# Patient Record
Sex: MALE | HISPANIC OR LATINO | Employment: OTHER | URBAN - METROPOLITAN AREA
[De-identification: names, ages, dates, MRNs, and addresses within clinical notes are randomized per-mention and may not be internally consistent; named-entity substitution may affect disease eponyms.]

---

## 2022-05-10 ENCOUNTER — APPOINTMENT (OUTPATIENT)
Dept: GENERAL RADIOLOGY | Facility: HOSPITAL | Age: 52
End: 2022-05-10

## 2022-05-10 ENCOUNTER — HOSPITAL ENCOUNTER (OUTPATIENT)
Facility: HOSPITAL | Age: 52
Setting detail: OBSERVATION
Discharge: HOME | End: 2022-05-11
Attending: EMERGENCY MEDICINE | Admitting: EMERGENCY MEDICINE

## 2022-05-10 ENCOUNTER — APPOINTMENT (OUTPATIENT)
Dept: CT IMAGING | Facility: HOSPITAL | Age: 52
End: 2022-05-10

## 2022-05-10 DIAGNOSIS — H53.9 VISUAL CHANGES: Primary | ICD-10-CM

## 2022-05-10 DIAGNOSIS — I10 ELEVATED BLOOD PRESSURE READING WITH DIAGNOSIS OF HYPERTENSION: ICD-10-CM

## 2022-05-10 PROBLEM — G45.9 TIA (TRANSIENT ISCHEMIC ATTACK): Status: ACTIVE | Noted: 2022-05-10

## 2022-05-10 LAB
ALBUMIN SERPL-MCNC: 4.7 G/DL (ref 3.5–5.2)
ALBUMIN/GLOB SERPL: 1.6 G/DL
ALP SERPL-CCNC: 61 U/L (ref 39–117)
ALT SERPL W P-5'-P-CCNC: 32 U/L (ref 1–41)
ANION GAP SERPL CALCULATED.3IONS-SCNC: 14 MMOL/L (ref 5–15)
AST SERPL-CCNC: 25 U/L (ref 1–40)
BASOPHILS # BLD AUTO: 0.05 10*3/MM3 (ref 0–0.2)
BASOPHILS NFR BLD AUTO: 0.7 % (ref 0–1.5)
BILIRUB SERPL-MCNC: 0.3 MG/DL (ref 0–1.2)
BUN SERPL-MCNC: 19 MG/DL (ref 6–20)
BUN/CREAT SERPL: 22.1 (ref 7–25)
CALCIUM SPEC-SCNC: 10 MG/DL (ref 8.6–10.5)
CHLORIDE SERPL-SCNC: 99 MMOL/L (ref 98–107)
CO2 SERPL-SCNC: 25 MMOL/L (ref 22–29)
CREAT SERPL-MCNC: 0.86 MG/DL (ref 0.76–1.27)
CRP SERPL-MCNC: 0.52 MG/DL (ref 0–0.5)
DEPRECATED RDW RBC AUTO: 44.3 FL (ref 37–54)
EGFRCR SERPLBLD CKD-EPI 2021: 104.8 ML/MIN/1.73
EOSINOPHIL # BLD AUTO: 0.03 10*3/MM3 (ref 0–0.4)
EOSINOPHIL NFR BLD AUTO: 0.4 % (ref 0.3–6.2)
ERYTHROCYTE [DISTWIDTH] IN BLOOD BY AUTOMATED COUNT: 13.1 % (ref 12.3–15.4)
ERYTHROCYTE [SEDIMENTATION RATE] IN BLOOD: 5 MM/HR (ref 0–20)
GLOBULIN UR ELPH-MCNC: 3 GM/DL
GLUCOSE BLDC GLUCOMTR-MCNC: 114 MG/DL (ref 70–130)
GLUCOSE SERPL-MCNC: 108 MG/DL (ref 65–99)
HCT VFR BLD AUTO: 47.3 % (ref 37.5–51)
HGB BLD-MCNC: 16.2 G/DL (ref 13–17.7)
IMM GRANULOCYTES # BLD AUTO: 0.03 10*3/MM3 (ref 0–0.05)
IMM GRANULOCYTES NFR BLD AUTO: 0.4 % (ref 0–0.5)
LYMPHOCYTES # BLD AUTO: 1.36 10*3/MM3 (ref 0.7–3.1)
LYMPHOCYTES NFR BLD AUTO: 17.8 % (ref 19.6–45.3)
MCH RBC QN AUTO: 31.5 PG (ref 26.6–33)
MCHC RBC AUTO-ENTMCNC: 34.2 G/DL (ref 31.5–35.7)
MCV RBC AUTO: 91.8 FL (ref 79–97)
MONOCYTES # BLD AUTO: 0.47 10*3/MM3 (ref 0.1–0.9)
MONOCYTES NFR BLD AUTO: 6.1 % (ref 5–12)
NEUTROPHILS NFR BLD AUTO: 5.71 10*3/MM3 (ref 1.7–7)
NEUTROPHILS NFR BLD AUTO: 74.6 % (ref 42.7–76)
NRBC BLD AUTO-RTO: 0 /100 WBC (ref 0–0.2)
PLATELET # BLD AUTO: 243 10*3/MM3 (ref 140–450)
PMV BLD AUTO: 10.7 FL (ref 6–12)
POTASSIUM SERPL-SCNC: 3.9 MMOL/L (ref 3.5–5.2)
PROT SERPL-MCNC: 7.7 G/DL (ref 6–8.5)
QT INTERVAL: 354 MS
RBC # BLD AUTO: 5.15 10*6/MM3 (ref 4.14–5.8)
SARS-COV-2 RNA PNL SPEC NAA+PROBE: NOT DETECTED
SODIUM SERPL-SCNC: 138 MMOL/L (ref 136–145)
WBC NRBC COR # BLD: 7.65 10*3/MM3 (ref 3.4–10.8)

## 2022-05-10 PROCEDURE — 87635 SARS-COV-2 COVID-19 AMP PRB: CPT | Performed by: NURSE PRACTITIONER

## 2022-05-10 PROCEDURE — G0378 HOSPITAL OBSERVATION PER HR: HCPCS

## 2022-05-10 PROCEDURE — 70496 CT ANGIOGRAPHY HEAD: CPT

## 2022-05-10 PROCEDURE — 80053 COMPREHEN METABOLIC PANEL: CPT | Performed by: NURSE PRACTITIONER

## 2022-05-10 PROCEDURE — 82962 GLUCOSE BLOOD TEST: CPT

## 2022-05-10 PROCEDURE — C9803 HOPD COVID-19 SPEC COLLECT: HCPCS

## 2022-05-10 PROCEDURE — 85652 RBC SED RATE AUTOMATED: CPT | Performed by: EMERGENCY MEDICINE

## 2022-05-10 PROCEDURE — 99204 OFFICE O/P NEW MOD 45 MIN: CPT | Performed by: PSYCHIATRY & NEUROLOGY

## 2022-05-10 PROCEDURE — 80061 LIPID PANEL: CPT | Performed by: PHYSICIAN ASSISTANT

## 2022-05-10 PROCEDURE — 99284 EMERGENCY DEPT VISIT MOD MDM: CPT

## 2022-05-10 PROCEDURE — 85025 COMPLETE CBC W/AUTO DIFF WBC: CPT | Performed by: NURSE PRACTITIONER

## 2022-05-10 PROCEDURE — 93010 ELECTROCARDIOGRAM REPORT: CPT | Performed by: INTERNAL MEDICINE

## 2022-05-10 PROCEDURE — 86140 C-REACTIVE PROTEIN: CPT | Performed by: EMERGENCY MEDICINE

## 2022-05-10 PROCEDURE — 71045 X-RAY EXAM CHEST 1 VIEW: CPT

## 2022-05-10 PROCEDURE — 70498 CT ANGIOGRAPHY NECK: CPT

## 2022-05-10 PROCEDURE — 93005 ELECTROCARDIOGRAM TRACING: CPT | Performed by: NURSE PRACTITIONER

## 2022-05-10 PROCEDURE — 0 IOPAMIDOL PER 1 ML: Performed by: EMERGENCY MEDICINE

## 2022-05-10 PROCEDURE — 83036 HEMOGLOBIN GLYCOSYLATED A1C: CPT | Performed by: NURSE PRACTITIONER

## 2022-05-10 RX ORDER — PROPARACAINE HYDROCHLORIDE 5 MG/ML
1 SOLUTION/ DROPS OPHTHALMIC ONCE
Status: DISCONTINUED | OUTPATIENT
Start: 2022-05-10 | End: 2022-05-11 | Stop reason: HOSPADM

## 2022-05-10 RX ORDER — SODIUM CHLORIDE 0.9 % (FLUSH) 0.9 %
10 SYRINGE (ML) INJECTION AS NEEDED
Status: DISCONTINUED | OUTPATIENT
Start: 2022-05-10 | End: 2022-05-11 | Stop reason: HOSPADM

## 2022-05-10 RX ORDER — ATORVASTATIN CALCIUM 80 MG/1
80 TABLET, FILM COATED ORAL NIGHTLY
Status: DISCONTINUED | OUTPATIENT
Start: 2022-05-10 | End: 2022-05-11 | Stop reason: HOSPADM

## 2022-05-10 RX ORDER — LISINOPRIL 10 MG/1
10 TABLET ORAL ONCE
Status: COMPLETED | OUTPATIENT
Start: 2022-05-10 | End: 2022-05-10

## 2022-05-10 RX ORDER — ENALAPRIL MALEATE 20 MG/1
20 TABLET ORAL DAILY
COMMUNITY

## 2022-05-10 RX ORDER — HYDROCHLOROTHIAZIDE 25 MG/1
25 TABLET ORAL DAILY
COMMUNITY

## 2022-05-10 RX ORDER — NITROGLYCERIN 0.4 MG/1
0.4 TABLET SUBLINGUAL
Status: DISCONTINUED | OUTPATIENT
Start: 2022-05-10 | End: 2022-05-11 | Stop reason: HOSPADM

## 2022-05-10 RX ORDER — ONDANSETRON 4 MG/1
4 TABLET, FILM COATED ORAL EVERY 6 HOURS PRN
Status: DISCONTINUED | OUTPATIENT
Start: 2022-05-10 | End: 2022-05-11 | Stop reason: HOSPADM

## 2022-05-10 RX ORDER — ASPIRIN 325 MG
325 TABLET, DELAYED RELEASE (ENTERIC COATED) ORAL ONCE
Status: COMPLETED | OUTPATIENT
Start: 2022-05-10 | End: 2022-05-11

## 2022-05-10 RX ORDER — ONDANSETRON 2 MG/ML
4 INJECTION INTRAMUSCULAR; INTRAVENOUS EVERY 6 HOURS PRN
Status: DISCONTINUED | OUTPATIENT
Start: 2022-05-10 | End: 2022-05-11 | Stop reason: HOSPADM

## 2022-05-10 RX ORDER — SODIUM CHLORIDE 0.9 % (FLUSH) 0.9 %
10 SYRINGE (ML) INJECTION EVERY 12 HOURS SCHEDULED
Status: DISCONTINUED | OUTPATIENT
Start: 2022-05-10 | End: 2022-05-11 | Stop reason: HOSPADM

## 2022-05-10 RX ORDER — ACETAMINOPHEN 325 MG/1
650 TABLET ORAL EVERY 4 HOURS PRN
Status: DISCONTINUED | OUTPATIENT
Start: 2022-05-10 | End: 2022-05-11 | Stop reason: HOSPADM

## 2022-05-10 RX ADMIN — LISINOPRIL 10 MG: 10 TABLET ORAL at 19:38

## 2022-05-10 RX ADMIN — IOPAMIDOL 95 ML: 755 INJECTION, SOLUTION INTRAVENOUS at 21:37

## 2022-05-10 NOTE — ED PROVIDER NOTES
EMERGENCY DEPARTMENT ENCOUNTER    Room Number:  119/1  Date seen:  5/10/2022  Time seen: 18:57 EDT  PCP: Provider, No Known  Historian: marisela   ID # [id number]    HPI:  Chief complaint:black spots in vision, elevated BP  A complete HPI/ROS/PMH/PSH/SH/FH are unobtainable due to: n/a  Context:Pravin Penny is a 51 y.o. male with PMH hypertension for at least 10 years on HCTZ and Enalapril 5 mg  who presents to the ED with c/o 24 hours of black spots in his vision that started last night.  Described as spots that move when he moves his eyes and he denies loss of vision.  It is not made better/worse by anything. He has not had this problem before.  He has associated bilateral temporal headache.  Denies chest pain, shortness of breath or h/o diabetes.  States he is on Enalapril 5 mg from [place] and HCTZ (unknown dose).  He has no current PCP and no current  Eye care provider. He does wear eye glasses and denies any eye trauma or injury.     Patient was placed in face mask in first look. Patient was wearing facemask when I entered the room and throughout our encounter. I wore full protective equipment throughout this patient encounter including a N95 face mask, eye shield and gloves. Hand hygiene/washing of hands was performed before donning protective equipment and after removal when leaving the room.    MEDICAL RECORD REVIEW    ALLERGIES  Patient has no known allergies.    PAST MEDICAL HISTORY  Active Ambulatory Problems     Diagnosis Date Noted   • No Active Ambulatory Problems     Resolved Ambulatory Problems     Diagnosis Date Noted   • No Resolved Ambulatory Problems     No Additional Past Medical History       PAST SURGICAL HISTORY  No past surgical history on file.    FAMILY HISTORY  No family history on file.    SOCIAL HISTORY  Social History     Socioeconomic History   • Marital status:        REVIEW OF SYSTEMS  Review of Systems    All systems reviewed and negative except for those  discussed in HPI.     PHYSICAL EXAM    ED Triage Vitals [05/10/22 1651]   Temp Heart Rate Resp BP SpO2   97.3 °F (36.3 °C) 73 18 -- 97 %      Temp src Heart Rate Source Patient Position BP Location FiO2 (%)   Tympanic Monitor -- -- --     Physical Exam    I have reviewed the triage vital signs and nursing notes.      GENERAL: not distressed  HENT: nares patent, mm moist, no facial droop  EYES: no scleral icterus, PERRL, EOMI  NECK: no ROM limitations  CV: regular rhythm, regular rate, no murmur, no rubs, no gallups  RESPIRATORY: normal effort, CTAB  ABDOMEN: soft  : deferred  MUSCULOSKELETAL: no deformity  NEURO: alert, moves all extremities, follows commands    Cranial nerves 2-12 intact as tested.  Sensation intact.  5/5 strength in all extremities.  Normal cerebellar testing.  No drift in any extremity.  No dysarthria.  No aphasia.  No neglect/extinction.     SKIN: warm, dry    LAB RESULTS  Recent Results (from the past 24 hour(s))   ECG 12 Lead    Collection Time: 05/10/22  7:35 PM   Result Value Ref Range    QT Interval 354 ms   Comprehensive Metabolic Panel    Collection Time: 05/10/22  7:45 PM    Specimen: Blood   Result Value Ref Range    Glucose 108 (H) 65 - 99 mg/dL    BUN 19 6 - 20 mg/dL    Creatinine 0.86 0.76 - 1.27 mg/dL    Sodium 138 136 - 145 mmol/L    Potassium 3.9 3.5 - 5.2 mmol/L    Chloride 99 98 - 107 mmol/L    CO2 25.0 22.0 - 29.0 mmol/L    Calcium 10.0 8.6 - 10.5 mg/dL    Total Protein 7.7 6.0 - 8.5 g/dL    Albumin 4.70 3.50 - 5.20 g/dL    ALT (SGPT) 32 1 - 41 U/L    AST (SGOT) 25 1 - 40 U/L    Alkaline Phosphatase 61 39 - 117 U/L    Total Bilirubin 0.3 0.0 - 1.2 mg/dL    Globulin 3.0 gm/dL    A/G Ratio 1.6 g/dL    BUN/Creatinine Ratio 22.1 7.0 - 25.0    Anion Gap 14.0 5.0 - 15.0 mmol/L    eGFR 104.8 >60.0 mL/min/1.73   CBC Auto Differential    Collection Time: 05/10/22  7:45 PM    Specimen: Blood   Result Value Ref Range    WBC 7.65 3.40 - 10.80 10*3/mm3    RBC 5.15 4.14 - 5.80 10*6/mm3     Hemoglobin 16.2 13.0 - 17.7 g/dL    Hematocrit 47.3 37.5 - 51.0 %    MCV 91.8 79.0 - 97.0 fL    MCH 31.5 26.6 - 33.0 pg    MCHC 34.2 31.5 - 35.7 g/dL    RDW 13.1 12.3 - 15.4 %    RDW-SD 44.3 37.0 - 54.0 fl    MPV 10.7 6.0 - 12.0 fL    Platelets 243 140 - 450 10*3/mm3    Neutrophil % 74.6 42.7 - 76.0 %    Lymphocyte % 17.8 (L) 19.6 - 45.3 %    Monocyte % 6.1 5.0 - 12.0 %    Eosinophil % 0.4 0.3 - 6.2 %    Basophil % 0.7 0.0 - 1.5 %    Immature Grans % 0.4 0.0 - 0.5 %    Neutrophils, Absolute 5.71 1.70 - 7.00 10*3/mm3    Lymphocytes, Absolute 1.36 0.70 - 3.10 10*3/mm3    Monocytes, Absolute 0.47 0.10 - 0.90 10*3/mm3    Eosinophils, Absolute 0.03 0.00 - 0.40 10*3/mm3    Basophils, Absolute 0.05 0.00 - 0.20 10*3/mm3    Immature Grans, Absolute 0.03 0.00 - 0.05 10*3/mm3    nRBC 0.0 0.0 - 0.2 /100 WBC   Sedimentation Rate    Collection Time: 05/10/22  7:45 PM    Specimen: Blood   Result Value Ref Range    Sed Rate 5 0 - 20 mm/hr   C-reactive Protein    Collection Time: 05/10/22  7:45 PM    Specimen: Blood   Result Value Ref Range    C-Reactive Protein 0.52 (H) 0.00 - 0.50 mg/dL   POC Glucose Once    Collection Time: 05/10/22  7:47 PM    Specimen: Blood   Result Value Ref Range    Glucose 114 70 - 130 mg/dL   COVID-19,[facility] CEPHEID/YOLANDA NP SWAB IN TRANSPORT MEDIA 8-12 HR TAT - Swab, Nasopharynx    Collection Time: 05/10/22  8:21 PM    Specimen: Nasopharynx; Swab   Result Value Ref Range    COVID19 Not Detected Not Detected - Ref. Range         RADIOLOGY RESULTS  XR Chest 1 View    Result Date: 5/10/2022  PORTABLE CHEST X-RAY  HISTORY: Evaluate cardiomegaly emergently.  Portable chest x-ray is provided. Correlation: None.  FINDINGS: The cardiomediastinal silhouette is normal. The lungs are clear. The costophrenic sulci are dry and the bones appear normal. There is no pneumothorax.      Negative. There is no enlargement of the cardiac silhouette.  This report was finalized on 5/10/2022 8:02 PM by Dr. Urias  YG Chicas.           PROGRESS, DATA ANALYSIS, CONSULTS AND MEDICAL DECISION MAKING  All labs have been independently reviewed by me.  All radiology studies have been reviewed by me and discussed with radiologist dictating the report.  EKG's independently viewed and interpreted by me unless stated otherwise. Discussion below represents my analysis of pertinent findings related to patient's condition, differential diagnosis, treatment plan and final disposition.     ED Course as of 05/10/22 2253   Tue May 10, 2022   2017 Discussed patient with Dr. Mcdonald, Stroke neurologist.  Will get CTA head and neck and plan for admission.  [EW]   2045  ID # [id number] Used again.  Pt's home meds clarified.  He takes 20 mg Enalapril and 25 mg HCTZ.  I have updated him about plan for admission to OBS unit for stroke workup  [EW]   2114 Discussed admission with AVE Fong in OBS unit.   [EW]      ED Course User Index  [EW] Tracey Jiang APRN     DDX: hypertension, visual changes, TIA    MDM: Patient with no acute neurological deficits.  Care was discussed with stroke neurology and we felt the patient would benefit from overnight admission after CTA head and neck for MRI in the morning.     Reviewed pt's history and workup with Dr. Hyman.  After a bedside evaluation, Dr. Hyman agrees with the plan of care.    Based on the patient's lab findings and presenting symptoms, the doctor and I feel it is appropriate to admit the patient for further management, evaluation, and treatment.  I have discussed this with the admitting team.  I have also discussed this with the patient/family.  They are in agreement with admission.          Disposition vitals:  /98   Pulse 84   Temp 97.3 °F (36.3 °C) (Tympanic)   Resp 18   SpO2 97%       DIAGNOSIS  Final diagnoses:   Visual changes   Elevated blood pressure reading with diagnosis of hypertension       Admission to OBS unit     Tracey Jiang APRN  05/10/22  [id number]

## 2022-05-10 NOTE — ED NOTES
PT presents to ED with complaints of L eye pain x3 days with intermitting headache. Pt speaks [place]. With interputer pt is a&OX4, able to ambulate, and in a mask at this time.

## 2022-05-10 NOTE — ED PROVIDER NOTES
The VICKY and I have discussed this patient's history, physical exam, and treatment plan.  I have reviewed the documentation and personally had a face to face interaction with the patient. I affirm the documentation and agree with the treatment and plan.  The attached note describes my personal findings.      I provided a substantive portion of the care of the patient.  I personally performed the physical exam in its entirety, and below are my findings.     Brief history of present illness: 51-year-old male with history of hypertension and takes medicines he brought with him from [place] presents to the emergency department with complaining of spots in the vision of his left eye primarily that seem to wax and wane and has not completely lost vision.  No clear exacerbating relieving factors.  He has had some mild by lateral temporal headache.  He denies any other pain at this time.    Physical exam:    BP (!) 182/117   Pulse 73   Temp 97.3 °F (36.3 °C) (Tympanic)   Resp 18   SpO2 97%       Physical Exam   Constitutional: No distress.  Nontoxic  HENT:  Head: Normocephalic and atraumatic.   Oropharynx: Mucous membranes are moist.   Eyes: . No scleral icterus. No conjunctival pallor.  PERRL, EOMI  Neck: Normal range of motion. Neck supple.  No meningismus.  No bruit appreciated  Cardiovascular: Pink warm and well perfused throughout.    Pulmonary/Chest: No respiratory distress.  No tachypnea or increased work of breathing appreciated.    Abdominal: Soft. There is no tenderness. There is no rebound and no guarding.   Musculoskeletal: Moves all extremities equally.    Neurological: Alert and oriented.  No acute focal deficit appreciated.  Skin: Skin is pink, warm, and dry.   Psychiatric: Mood and affect normal.   Nursing note and vitals reviewed.        MDM:      I have personally reviewed and interpreted the EKG obtained today in the emergency department at 1935 concomitant with treatment.  EKG reveals rhythm/rate -sinus,  85. ND-LINO within normal limits.  QRS-narrow complex ST/T-wave -no STEMI; QTC within normal limits; comparison none available    Given the patient's uncontrolled hypertension and new onset unilateral vision issues, amaurosis fugax or carotid disease must be considered.  Also considered is giant cell arteritis or atypical migraines.  Plan further imaging and neurology consultation.     Mark Hyman MD  05/10/22 2015

## 2022-05-11 ENCOUNTER — APPOINTMENT (OUTPATIENT)
Dept: MRI IMAGING | Facility: HOSPITAL | Age: 52
End: 2022-05-11

## 2022-05-11 ENCOUNTER — APPOINTMENT (OUTPATIENT)
Dept: CARDIOLOGY | Facility: HOSPITAL | Age: 52
End: 2022-05-11

## 2022-05-11 ENCOUNTER — READMISSION MANAGEMENT (OUTPATIENT)
Dept: CALL CENTER | Facility: HOSPITAL | Age: 52
End: 2022-05-11

## 2022-05-11 VITALS
DIASTOLIC BLOOD PRESSURE: 88 MMHG | OXYGEN SATURATION: 97 % | WEIGHT: 190 LBS | HEIGHT: 65 IN | SYSTOLIC BLOOD PRESSURE: 140 MMHG | BODY MASS INDEX: 31.65 KG/M2 | HEART RATE: 76 BPM | RESPIRATION RATE: 16 BRPM | TEMPERATURE: 98.4 F

## 2022-05-11 LAB
ANION GAP SERPL CALCULATED.3IONS-SCNC: 11.8 MMOL/L (ref 5–15)
BH CV ECHO MEAS - ACS: 2.34 CM
BH CV ECHO MEAS - AO MAX PG: 7.1 MMHG
BH CV ECHO MEAS - AO MEAN PG: 4.6 MMHG
BH CV ECHO MEAS - AO ROOT DIAM: 3.4 CM
BH CV ECHO MEAS - AO V2 MAX: 133 CM/SEC
BH CV ECHO MEAS - AO V2 VTI: 21.2 CM
BH CV ECHO MEAS - AVA(I,D): 3 CM2
BH CV ECHO MEAS - EDV(CUBED): 25.6 ML
BH CV ECHO MEAS - EDV(MOD-SP2): 40 ML
BH CV ECHO MEAS - EDV(MOD-SP4): 41 ML
BH CV ECHO MEAS - EF(MOD-BP): 64.8 %
BH CV ECHO MEAS - EF(MOD-SP2): 65 %
BH CV ECHO MEAS - EF(MOD-SP4): 61 %
BH CV ECHO MEAS - ESV(CUBED): 8.5 ML
BH CV ECHO MEAS - ESV(MOD-SP2): 14 ML
BH CV ECHO MEAS - ESV(MOD-SP4): 16 ML
BH CV ECHO MEAS - FS: 30.7 %
BH CV ECHO MEAS - IVS/LVPW: 0.71 CM
BH CV ECHO MEAS - IVSD: 1.37 CM
BH CV ECHO MEAS - LA DIMENSION: 3.4 CM
BH CV ECHO MEAS - LAT PEAK E' VEL: 15.4 CM/SEC
BH CV ECHO MEAS - LV DIASTOLIC VOL/BSA (35-75): 20.5 CM2
BH CV ECHO MEAS - LV MASS(C)D: 182.4 GRAMS
BH CV ECHO MEAS - LV MAX PG: 4.2 MMHG
BH CV ECHO MEAS - LV MEAN PG: 2.19 MMHG
BH CV ECHO MEAS - LV SYSTOLIC VOL/BSA (12-30): 8 CM2
BH CV ECHO MEAS - LV V1 MAX: 102.8 CM/SEC
BH CV ECHO MEAS - LV V1 VTI: 16.7 CM
BH CV ECHO MEAS - LVIDD: 2.9 CM
BH CV ECHO MEAS - LVIDS: 2.04 CM
BH CV ECHO MEAS - LVOT AREA: 3.8 CM2
BH CV ECHO MEAS - LVOT DIAM: 2.2 CM
BH CV ECHO MEAS - LVPWD: 1.6 CM
BH CV ECHO MEAS - MED PEAK E' VEL: 9.1 CM/SEC
BH CV ECHO MEAS - MV A MAX VEL: 66.4 CM/SEC
BH CV ECHO MEAS - MV DEC SLOPE: 260 CM/SEC2
BH CV ECHO MEAS - MV DEC TIME: 0.22 MSEC
BH CV ECHO MEAS - MV E MAX VEL: 57 CM/SEC
BH CV ECHO MEAS - MV E/A: 0.86
BH CV ECHO MEAS - MV MAX PG: 3.7 MMHG
BH CV ECHO MEAS - MV MEAN PG: 1.28 MMHG
BH CV ECHO MEAS - MV P1/2T: 76.5 MSEC
BH CV ECHO MEAS - MV V2 VTI: 19.5 CM
BH CV ECHO MEAS - MVA(P1/2T): 2.9 CM2
BH CV ECHO MEAS - MVA(VTI): 3.2 CM2
BH CV ECHO MEAS - PA ACC TIME: 0.15 SEC
BH CV ECHO MEAS - PA PR(ACCEL): 12.5 MMHG
BH CV ECHO MEAS - PA V2 MAX: 123.5 CM/SEC
BH CV ECHO MEAS - QP/QS: 1.31
BH CV ECHO MEAS - RV MAX PG: 2.7 MMHG
BH CV ECHO MEAS - RV V1 MAX: 81.8 CM/SEC
BH CV ECHO MEAS - RV V1 VTI: 15.2 CM
BH CV ECHO MEAS - RVOT DIAM: 2.6 CM
BH CV ECHO MEAS - SI(MOD-SP2): 13 ML/M2
BH CV ECHO MEAS - SI(MOD-SP4): 12.5 ML/M2
BH CV ECHO MEAS - SV(LVOT): 63.3 ML
BH CV ECHO MEAS - SV(MOD-SP2): 26 ML
BH CV ECHO MEAS - SV(MOD-SP4): 25 ML
BH CV ECHO MEAS - SV(RVOT): 82.8 ML
BH CV ECHO MEAS - TAPSE (>1.6): 1.8 CM
BH CV ECHO MEASUREMENTS AVERAGE E/E' RATIO: 4.65
BH CV XLRA - RV BASE: 2.8 CM
BH CV XLRA - RV LENGTH: 6.2 CM
BH CV XLRA - RV MID: 2.6 CM
BH CV XLRA - TDI S': 17.7 CM/SEC
BUN SERPL-MCNC: 16 MG/DL (ref 6–20)
BUN/CREAT SERPL: 18.2 (ref 7–25)
CALCIUM SPEC-SCNC: 9.5 MG/DL (ref 8.6–10.5)
CHLORIDE SERPL-SCNC: 100 MMOL/L (ref 98–107)
CHOLEST SERPL-MCNC: 211 MG/DL (ref 0–200)
CO2 SERPL-SCNC: 27.2 MMOL/L (ref 22–29)
CREAT SERPL-MCNC: 0.88 MG/DL (ref 0.76–1.27)
DEPRECATED RDW RBC AUTO: 43.8 FL (ref 37–54)
EGFRCR SERPLBLD CKD-EPI 2021: 104.1 ML/MIN/1.73
ERYTHROCYTE [DISTWIDTH] IN BLOOD BY AUTOMATED COUNT: 13.1 % (ref 12.3–15.4)
GLUCOSE SERPL-MCNC: 95 MG/DL (ref 65–99)
HBA1C MFR BLD: 5.6 % (ref 4.8–5.6)
HCT VFR BLD AUTO: 46.2 % (ref 37.5–51)
HDLC SERPL-MCNC: 36 MG/DL (ref 40–60)
HGB BLD-MCNC: 15.5 G/DL (ref 13–17.7)
LDLC SERPL CALC-MCNC: 143 MG/DL (ref 0–100)
LDLC/HDLC SERPL: 3.89 {RATIO}
LEFT ATRIUM VOLUME INDEX: 11.6 ML/M2
MAXIMAL PREDICTED HEART RATE: 169 BPM
MCH RBC QN AUTO: 31 PG (ref 26.6–33)
MCHC RBC AUTO-ENTMCNC: 33.5 G/DL (ref 31.5–35.7)
MCV RBC AUTO: 92.4 FL (ref 79–97)
PLATELET # BLD AUTO: 232 10*3/MM3 (ref 140–450)
PMV BLD AUTO: 10.9 FL (ref 6–12)
POTASSIUM SERPL-SCNC: 3.4 MMOL/L (ref 3.5–5.2)
RBC # BLD AUTO: 5 10*6/MM3 (ref 4.14–5.8)
SINUS: 2.8 CM
SODIUM SERPL-SCNC: 139 MMOL/L (ref 136–145)
STJ: 2.8 CM
STRESS TARGET HR: 144 BPM
TRIGL SERPL-MCNC: 175 MG/DL (ref 0–150)
VLDLC SERPL-MCNC: 32 MG/DL (ref 5–40)
WBC NRBC COR # BLD: 8.93 10*3/MM3 (ref 3.4–10.8)

## 2022-05-11 PROCEDURE — 99213 OFFICE O/P EST LOW 20 MIN: CPT | Performed by: NURSE PRACTITIONER

## 2022-05-11 PROCEDURE — 93306 TTE W/DOPPLER COMPLETE: CPT | Performed by: INTERNAL MEDICINE

## 2022-05-11 PROCEDURE — G0378 HOSPITAL OBSERVATION PER HR: HCPCS

## 2022-05-11 PROCEDURE — 85027 COMPLETE CBC AUTOMATED: CPT | Performed by: PHYSICIAN ASSISTANT

## 2022-05-11 PROCEDURE — 93306 TTE W/DOPPLER COMPLETE: CPT

## 2022-05-11 PROCEDURE — 70551 MRI BRAIN STEM W/O DYE: CPT

## 2022-05-11 PROCEDURE — 80048 BASIC METABOLIC PNL TOTAL CA: CPT | Performed by: PHYSICIAN ASSISTANT

## 2022-05-11 RX ORDER — HYDROCHLOROTHIAZIDE 25 MG/1
25 TABLET ORAL DAILY
Status: DISCONTINUED | OUTPATIENT
Start: 2022-05-11 | End: 2022-05-11 | Stop reason: HOSPADM

## 2022-05-11 RX ORDER — ENALAPRIL MALEATE 20 MG/1
20 TABLET ORAL DAILY
Status: DISCONTINUED | OUTPATIENT
Start: 2022-05-11 | End: 2022-05-11 | Stop reason: HOSPADM

## 2022-05-11 RX ADMIN — Medication 10 ML: at 00:42

## 2022-05-11 RX ADMIN — ASPIRIN 325 MG: 325 TABLET, COATED ORAL at 00:42

## 2022-05-11 RX ADMIN — HYDROCHLOROTHIAZIDE 25 MG: 25 TABLET ORAL at 08:46

## 2022-05-11 RX ADMIN — Medication 10 ML: at 08:47

## 2022-05-11 RX ADMIN — ENALAPRIL MALEATE 20 MG: 20 TABLET ORAL at 08:46

## 2022-05-11 RX ADMIN — ATORVASTATIN CALCIUM 80 MG: 80 TABLET, FILM COATED ORAL at 00:42

## 2022-05-11 NOTE — PLAN OF CARE
Goal Outcome Evaluation:  Plan of Care Reviewed With: patient        Progress: improving  Outcome Evaluation: Pt admitted from ER to Obs due to potential TIA; neuro consulted; ophathalmalogy consulted; case management consulted; CT completed; pt primary language Vietnamese,  used; MRI planned; NIH 0; pt alert, oriented, and resting in bed; pt denies pain; vss

## 2022-05-11 NOTE — PROGRESS NOTES
MD ATTESTATION NOTE    The VICKY and I have discussed this patient's history, physical exam, and treatment plan.  I have reviewed the documentation and personally had a face to face interaction with the patient. I affirm the documentation and agree with the treatment and plan.  The attached note describes my personal findings.      I provided a substantive portion of the care of the patient.  I personally performed the physical exam in its entirety, and below are my findings.  For this patient encounter, the patient wore surgical mask, I wore full protective PPE including N95 and eye protection.      Brief HPI: Patient presents complaining of 24 hours of black spots in his vision states that he moves when he moves his eyes denies any loss of visual acuity.  Patient never had this problem before.  Also for temporal headache.    PHYSICAL EXAM  ED Triage Vitals   Temp Heart Rate Resp BP SpO2   05/10/22 1651 05/10/22 1651 05/10/22 1651 05/10/22 1918 05/10/22 1651   97.3 °F (36.3 °C) 73 18 (!) 182/117 97 %      Temp src Heart Rate Source Patient Position BP Location FiO2 (%)   05/10/22 1651 05/10/22 1651 -- -- --   Tympanic Monitor            GENERAL: no acute distress  HENT: nares patent  EYES: no scleral icterus, EOMI PERRLA  CV: regular rhythm, normal rate  RESPIRATORY: normal effort  ABDOMEN: soft  MUSCULOSKELETAL: no deformity  NEURO: alert, moves all extremities, follows commands  PSYCH:  calm, cooperative  SKIN: warm, dry    Vital signs and nursing notes reviewed.        Plan: Neuro consult.  CT CTA, MRI pending

## 2022-05-11 NOTE — CASE MANAGEMENT/SOCIAL WORK
Discharge Planning Assessment  [facility]     Patient Name: Pravin Penny  MRN: [id number]  Today's Date: 5/11/2022    Admit Date: 5/10/2022     Discharge Needs Assessment     Row Name 05/11/22 1314       Living Environment    People in Home spouse    Current Living Arrangements apartment    Primary Care Provided by self    Provides Primary Care For no one    Family Caregiver if Needed spouse    Able to Return to Prior Arrangements yes       Resource/Environmental Concerns    Resource/Environmental Concerns financial    Financial Concerns other (see comments)  Patient does have insurance. He has Passport Medicaid.    Transportation Concerns none       Transition Planning    Patient/Family Anticipates Transition to home with family    Patient/Family Anticipated Services at Transition none    Transportation Anticipated family or friend will provide       Discharge Needs Assessment    Readmission Within the Last 30 Days no previous admission in last 30 days    Equipment Currently Used at Home none    Concerns to be Addressed discharge planning  Patient provided with [place] language list of primary care providers that accept Passport. Highligted provider closest to his home.    Anticipated Changes Related to Illness none    Equipment Needed After Discharge none               Discharge Plan     Row Name 05/11/22 3536       Plan    Plan Patient plans to return home upon discharge, where he lives with spouse in private residence. IND with IADL's and owns/uses no DME. He recently immigrated from [place] and reports increased stress from the move. He does not have a PCP here, so a list of [place] speaking providers that accept Passport was provided to him. I highligted the provider that is closest to his home and informed them that they take walk-ins during their normal hours of operation. Patient has reliable transportation home from his wife upon discharge. He reports that he has a month supply of his  prescriptions from [place] and is not in urgent need of any refills. He understands to follow up with one of the clinics on the list provided to him for his future refills and knows to bring a medication list or his medications with him to the clinic. No further discharge needs anticipated. Encouraged to contact CCP should needs change.    Patient/Family in Agreement with Plan yes    Plan Comments Home with spouse upon discharge, who can provide transportation. Patient provided with PCP follow-up information. No DC needs anticipated.              Continued Care and Services - Admitted Since 5/10/2022    Coordination has not been started for this encounter.          Demographic Summary     Row Name 05/11/22 1314       General Information    Preferred Language Maltese    Row Name 05/11/22 1312       General Information    Admission Type observation    Arrived From home    Required Notices Provided Observation Status Notice    Expected Length of Stay (LOS) Less than 24 hours. Admitted to ED Observation Unit.    Referral Source admission list;emergency department    Reason for Consult discharge planning    Preferred Language Maltese    General Information Comments Facesheet verified. Role of CCP explained. Appropriate PPE worn at all times.  used.       Contact Information    Permission Granted to Share Info With family/designee               Functional Status     Row Name 05/11/22 1313       Functional Status    Usual Activity Tolerance good    Current Activity Tolerance good       Functional Status, IADL    Medications independent    Meal Preparation independent    Housekeeping independent    Laundry independent    Shopping independent    IADL Comments IND with IADL's.       Mental Status    General Appearance WDL WDL       Mental Status Summary    Recent Changes in Mental Status/Cognitive Functioning no changes               Psychosocial     Row Name 05/11/22 1316       Behavior WDL    Behavior WDL WDL        Emotion Mood WDL    Emotion/Mood/Affect WDL WDL       Speech WDL    Speech WDL WDL       Perceptual State WDL    Perceptual State WDL WDL       Thought Process WDL    Thought Process WDL WDL       Intellectual Performance WDL    Intellectual Performance WDL WDL       Coping/Stress    Major Change/Loss/Stressor recent immigration    Patient Personal Strengths able to adapt;expressive of emotions;expressive of needs;self-reliant;motivated    Reaction to Health Status accepting;adjusting    Understanding of Condition and Treatment adequate understanding of medical condition;adequate understanding of treatment       Developmental Stage (Eriksson's)    Developmental Stage Stage 7 (35-65 years/Middle Adulthood) Generativity vs. Stagnation               Abuse/Neglect    No documentation.                Legal    No documentation.                Substance Abuse    No documentation.                Patient Forms    No documentation.                   Jamey Leon RN

## 2022-05-11 NOTE — ED NOTES
.  Nursing report ED to floor  Pravni Penny  51 y.o.  male    HPI :   Chief Complaint   Patient presents with   • Eye Problem       Admitting doctor:   Hay LLANES MD    Admitting diagnosis:   There were no encounter diagnoses.    Code status:   Current Code Status     Date Active Code Status Order ID Comments User Context       Not on file    Advance Care Planning Activity          Allergies:   Patient has no known allergies.    Intake and Output  No intake or output data in the 24 hours ending 05/10/22 2137    Weight:   There were no vitals filed for this visit.    Most recent vitals:   Vitals:    05/10/22 1918 05/10/22 1938 05/10/22 1939 05/10/22 2021   BP: (!) 182/117 159/100  (!) 164/101   Pulse:   85 84   Resp:       Temp:       TempSrc:       SpO2:           Active LDAs/IV Access:   Lines, Drains & Airways     Active LDAs     Name Placement date Placement time Site Days    Peripheral IV 05/10/22 1946 Right Antecubital 05/10/22  1946  Antecubital  less than 1                Labs (abnormal labs have a star):   Labs Reviewed   COMPREHENSIVE METABOLIC PANEL - Abnormal; Notable for the following components:       Result Value    Glucose 108 (*)     All other components within normal limits    Narrative:     GFR Normal >60  Chronic Kidney Disease <60  Kidney Failure <15     CBC WITH AUTO DIFFERENTIAL - Abnormal; Notable for the following components:    Lymphocyte % 17.8 (*)     All other components within normal limits   COVID-19,[facility] CEPHEID/YOLANDA, NP SWAB IN TRANSPORT MEDIA 8-12 HR TAT - Normal    Narrative:     Fact sheet for providers: https://www.fda.gov/media/405164/download    Fact sheet for patients: https://www.fda.gov/media/474830/download    Test performed by PCR.   SEDIMENTATION RATE - Normal   POCT GLUCOSE FINGERSTICK - Normal   COVID PRE-OP / PRE-PROCEDURE SCREENING ORDER (NO ISOLATION)    Narrative:     The following orders were created for panel order COVID PRE-OP /  PRE-PROCEDURE SCREENING ORDER (NO ISOLATION) - Swab, Nasopharynx.  Procedure                               Abnormality         Status                     ---------                               -----------         ------                     COVID-19,[facility]-HOUSE...[[id number]]  Normal              Final result                 Please view results for these tests on the individual orders.   HEMOGLOBIN A1C   C-REACTIVE PROTEIN   POCT GLUCOSE FINGERSTICK   CBC AND DIFFERENTIAL    Narrative:     The following orders were created for panel order CBC & Differential.  Procedure                               Abnormality         Status                     ---------                               -----------         ------                     CBC Auto Differential[[id number]]        Abnormal            Final result                 Please view results for these tests on the individual orders.       EKG:   ECG 12 Lead   Preliminary Result   HEART RATE= 85  bpm   RR Interval= 704  ms   WY Interval= 192  ms   P Horizontal Axis= -12  deg   P Front Axis= 30  deg   QRSD Interval= 82  ms   QT Interval= 354  ms   QRS Axis= 14  deg   T Wave Axis= 34  deg   - BORDERLINE ECG -   Sinus rhythm   Probable left atrial enlargement   Electronically Signed By:    Date and Time of Study: 2022-05-10 19:35:27          Meds given in ED:   Medications   fluorescein ophthalmic strip 1 strip (has no administration in time range)   proparacaine (ALCAINE) 0.5 % ophthalmic solution 1 drop (has no administration in time range)   iopamidol (ISOVUE-370) 76 % injection 100 mL (has no administration in time range)   lisinopril (PRINIVIL,ZESTRIL) tablet 10 mg (10 mg Oral Given 5/10/22 1938)       Imaging results:  XR Chest 1 View    Result Date: 5/10/2022  Negative. There is no enlargement of the cardiac silhouette.  This report was finalized on 5/10/2022 8:02 PM by Dr. Adonay Chicas M.D.        Ambulatory status:   - ad david    Social issues:   Social History      Socioeconomic History   • Marital status:

## 2022-05-11 NOTE — PROGRESS NOTES
ED OBSERVATION PROGRESS/DISCHARGE SUMMARY    Date of Admission: 5/10/2022   LOS: 0 days   PCP: Provider, No Known      Subjective     Patient states that he is still having black floaters in both eyes but is able to see at this time.  Denies any headaches.  Denies any numbness and tingling or weakness.  Denies any chest pain or shortness of breath.  Denies any fevers or chills.  Denies any abdominal pain, nausea, vomiting, and diarrhea.  Patient states that he has over 30 days of his home medications at this time.  He also states that he has someone to drive him home as well as drive him to his optometrist appointment.      Hospital Outcome:   51-year-old Latvian-speaking male with a history of hypertension who presents to [facility] ER with black spots bilaterally in his vision since last night.  CT head and CTA of the head and neck were negative for acute findings in the ER.  Patient was admitted to the observation unit for further evaluation.  MRI of the brain without contrast showed no acute findings.  A1c was 5.6 and lipid panel revealed triglycerides of 175, , HDL 36, total cholesterol of 211.  An echocardiogram was performed that showed an EF of 64.8 with normal systolic and diastolic function and moderate LVH.  Neurology saw and evaluated the patient recommending close follow-up with ophthalmology however this was unable to be scheduled due to the office that we had contacted earlier with Dr. Mcdonald stating that they would not be able to take his insurance.  Discussed this with Dr. Mcdonald who recommended for him to follow-up with an optometrist tomorrow morning.  Set up an appointment with Dr. Wei Mitchell at [facility] tomorrow at 7:30 AM.  Patient is also been provided with a list of Latvian-speaking primary care providers and advised to follow-up with them within 1 to 2 weeks.  I have discussed all of the results and plan for discharge with the patient who  endorses understanding is in agreement.  Patient states that he is able to make the appointment with Dr. Wei Mitchell tomorrow at 7:30 AM and will have someone drive him.   was used during this encounter.    ROS:  General: no fevers, chills  Respiratory: no cough, dyspnea  Cardiovascular: no chest pain, palpitations  Abdomen: No abdominal pain, nausea, vomiting, or diarrhea  Neurologic: No focal weakness    Objective   Physical Exam:  I have reviewed the vital signs.  Temp:  [97.3 °F (36.3 °C)-98.6 °F (37 °C)] 98.4 °F (36.9 °C)  Heart Rate:  [68-88] 76  Resp:  [16-18] 16  BP: (137-182)/() 140/88  General Appearance:  51-year-old male, well-nourished, no acute distress on room air  Head:    Normocephalic, atraumatic  Eyes:    Sclerae anicteric  Neck:   Supple, no mass  Lungs: Clear to auscultation bilaterally, respirations unlabored  Heart: Regular rate and rhythm, S1 and S2 normal, no murmur, rub or gallop  Abdomen:  Soft, non-tender, bowel sounds active, nondistended  Extremities: No clubbing, cyanosis, or edema to lower extremities  Pulses:  2+ and symmetric in distal lower extremities  Skin: No rashes   Neurologic: Oriented x3, Normal strength to extremities, sensation intact, no facial droop or dysarthria noted    Results Review:    I have reviewed the labs, radiology results and diagnostic studies.    Results from last 7 days   Lab Units 05/11/22  0548   WBC 10*3/mm3 8.93   HEMOGLOBIN g/dL 15.5   HEMATOCRIT % 46.2   PLATELETS 10*3/mm3 232     Results from last 7 days   Lab Units 05/11/22  0548 05/10/22  1945   SODIUM mmol/L 139 138   POTASSIUM mmol/L 3.4* 3.9   CHLORIDE mmol/L 100 99   CO2 mmol/L 27.2 25.0   BUN mg/dL 16 19   CREATININE mg/dL 0.88 0.86   CALCIUM mg/dL 9.5 10.0   BILIRUBIN mg/dL  --  0.3   ALK PHOS U/L  --  61   ALT (SGPT) U/L  --  32   AST (SGOT) U/L  --  25   GLUCOSE mg/dL 95 108*     Imaging Results (Last 24 Hours)     Procedure Component Value Units Date/Time    MRI Brain  Without Contrast [[id number]] Collected: 05/11/22 1247     Updated: 05/11/22 1258    Narrative:      MRI EXAMINATION OF THE BRAIN WITHOUT CONTRAST     HISTORY: TIA.     COMPARISON: CT angiogram 05/10/2022.     TECHNIQUE: A MRI examination of the brain was performed before and after  the intravenous administration of contrast utilizing sagittal T1, axial  diffusion, T1, T2, T2 FLAIR and gradient echo T2 imaging.     FINDINGS: There is no evidence of restricted diffusion to suggest acute  infarction. The brain ventricles are symmetrical. There is expected  flow-void in the basilar artery and in the distal aspect of the internal  carotid arteries bilaterally on the axial T2 sequence. A small volume of  fluid is present within the mastoid air cells on the right. There is no  evidence of mass or mass effect on this noncontrasted MRI examination of  brain.       Impression:      No evidence of acute infarction.     This report was finalized on 5/11/2022 12:54 PM by Dr. Jan Olmstead M.D.       CT Angiogram Head [[id number]] Collected: 05/10/22 2247     Updated: 05/10/22 2257    Narrative:      CT ANGIOGRAM HEAD-, CT ANGIOGRAM NECK-     CLINICAL HISTORY: Vision abnormalities.     TECHNIQUE: Transverse 3 mm thick images were obtained from the base of  the skull to the vertex without IV contrast. Subsequently, spiral CT  images were obtained through the neck and head during rapid IV injection  of contrast and were reconstructed in 1 mm thick axial slices. Multiple  coronal and sagittal and 3-D reconstructions were produced.     Radiation dose reduction techniques were utilized, including automated  exposure control and exposure modulation based on body size.     COMPARISON: None     FINDINGS: The precontrast images demonstrate no abnormalities. There is  no evidence of recent or old infarct or hemorrhage. There are no masses.  In particular, no mass is evident within or adjacent to the sella  turcica. The orbits appear  grossly normal.     There is normal branching of the great vessels from the aortic arch that  all appear widely patent without NASCET significant stenosis. No  stenoses are present within either common or external or internal  carotid artery within the neck. The vertebral arteries are codominant  and are both widely patent throughout their course in the neck and head.  Both supply the basilar artery. There are no intracranial stenoses or  occlusions. No aneurysms are evident.     Postcontrast images of the brain parenchyma demonstrate no enhancing  lesions. The paranasal sinuses are well aerated.       Impression:      Normal CT angiography imaging of the head and neck with and  without contrast.     This report was finalized on 5/10/2022 10:54 PM by Dr. Haile Baer M.D.       CT Angiogram Neck [[id number]] Collected: 05/10/22 2247     Updated: 05/10/22 2257    Narrative:      CT ANGIOGRAM HEAD-, CT ANGIOGRAM NECK-     CLINICAL HISTORY: Vision abnormalities.     TECHNIQUE: Transverse 3 mm thick images were obtained from the base of  the skull to the vertex without IV contrast. Subsequently, spiral CT  images were obtained through the neck and head during rapid IV injection  of contrast and were reconstructed in 1 mm thick axial slices. Multiple  coronal and sagittal and 3-D reconstructions were produced.     Radiation dose reduction techniques were utilized, including automated  exposure control and exposure modulation based on body size.     COMPARISON: None     FINDINGS: The precontrast images demonstrate no abnormalities. There is  no evidence of recent or old infarct or hemorrhage. There are no masses.  In particular, no mass is evident within or adjacent to the sella  turcica. The orbits appear grossly normal.     There is normal branching of the great vessels from the aortic arch that  all appear widely patent without NASCET significant stenosis. No  stenoses are present within either common or external or  internal  carotid artery within the neck. The vertebral arteries are codominant  and are both widely patent throughout their course in the neck and head.  Both supply the basilar artery. There are no intracranial stenoses or  occlusions. No aneurysms are evident.     Postcontrast images of the brain parenchyma demonstrate no enhancing  lesions. The paranasal sinuses are well aerated.       Impression:      Normal CT angiography imaging of the head and neck with and  without contrast.     This report was finalized on 5/10/2022 10:54 PM by Dr. Haile Baer M.D.       XR Chest 1 View [[id number]] Collected: 05/10/22 2001     Updated: 05/10/22 2005    Narrative:      PORTABLE CHEST X-RAY     HISTORY: Evaluate cardiomegaly emergently.     Portable chest x-ray is provided. Correlation: None.     FINDINGS: The cardiomediastinal silhouette is normal. The lungs are  clear. The costophrenic sulci are dry and the bones appear normal. There  is no pneumothorax.       Impression:      Negative. There is no enlargement of the cardiac silhouette.     This report was finalized on 5/10/2022 8:02 PM by Dr. Adonay Chicas M.D.             I have reviewed the medications.  ---------------------------------------------------------------------------------------------  Assessment & Plan   Assessment/Problem List    TIA (transient ischemic attack)      Plan:    Visual disturbance  -CT head and CTA of the head and neck negative for acute findings  -MRI brain without negative for acute findings  -Echocardiogram showed moderate LVH with normal systolic and diastolic function  -Neurology consulted, I spoken with Dr. Mcdonald and we have made a plan for patient to be seen by Dr. Wei Mitchell optometrist tomorrow morning at 7:30 AM with [facility].  -Patient advised to not drive until he is cleared by an optometrist.    Hypertension  -Home medications resumed, reports he has over a 30-day supply of his home  medications  -Patient advised to follow-up with a primary care provider in 1 to 2 weeks and establish care.    Disposition: Home    Follow-up after Discharge: Optometry, PCP    This note will serve as a discharge summary.    I wore an face mask, eye protection, and gloves during this patient encounter. Patient also wearing a surgical mask. Hand hygeine performed before and after seeing the patient.      Grace Banerjee PA-C 05/11/22 17:57 EDT

## 2022-05-11 NOTE — DISCHARGE INSTRUCTIONS
To have an appointment with Dr. Wei Mitchell at [facility] tomorrow 5/12/2022 at 7:30 AM.  Please keep this appointment.  Please do not drive until you have been cleared by the optometrist.  You have been provided a list of primary care providers the [facility] Dr. Alphonso Aparicio.  Please establish care with a primary care physician within 1 to 2 weeks.  Continue all home medications as prescribed.    Return to the emergency department with worsening symptoms, uncontrolled pain, inability to tolerate oral liquids, fever greater than 101°F not controlled by Tylenol or as needed with emergent concerns.

## 2022-05-11 NOTE — CONSULTS
DOS: 5/10/2022  NAME: Pravin Penny   : 1970  PCP: Provider, No Known  CC: Stroke  Referring MD: Dr. Manpreet Love        Neurological Problem and Interval History:  51 y.o. right-handed Malagasy male with a Hx of hypertension.  Today the blood pressure was high and he has been noting black dots in both his eye feels since this afternoon.  He denies any headaches.  Denies any nausea vomiting chest pain or palpitations.  Denies any speech impediment or weakness of his upper or lower extremities and denies any gait ataxia.  He is able to see me well and able to answer all my questions appropriately although I use sign language because he is completely Croatian-speaking.  Dr. Mark Hyman was able to converse with him using the dedicated computerized  device.    Past Medical/Surgical Hx:  Hypertension.    Review of Systems:    Constitutional: Pleasant gentleman currently laying in bed in no distress.  Cardiovascular: No chest pain or palpitations noted.  Respiratory: No shortness of breath noted.  Gastrointestinal: No nausea and vomiting noted.  Genitourinary: No bladder incontinence noted.  Musculoskeletal: No aches and pains in the muscles or joints noted.  Dermatological: No skin breakdown noted.  Neurological: No focal neurological deficits with NIH stroke scale of 0.  Psychiatric: Denies any underlying major anxiety or depression.  Ophthalmological: Seeing black dots in both his eyes but has good visual acuity.          Medications On Admission  (Not in a hospital admission)      Allergies:  No Known Allergies    Social Hx:  Social History     Socioeconomic History   • Marital status:        Family Hx:  Hypertension.    Review of Imaging (Interpretation of images not reports): Portable chest x-ray is unremarkable.    Additional Tests Performed: CT angiogram of the head and neck with contrast is currently pending.      Laboratory Results:   Lab Results   Component Value Date     GLUCOSE 108 (H) 05/10/2022    CALCIUM 10.0 05/10/2022     05/10/2022    K 3.9 05/10/2022    CO2 25.0 05/10/2022    CL 99 05/10/2022    BUN 19 05/10/2022    CREATININE 0.86 05/10/2022    BCR 22.1 05/10/2022    ANIONGAP 14.0 05/10/2022     Lab Results   Component Value Date    WBC 7.65 05/10/2022    HGB 16.2 05/10/2022    HCT 47.3 05/10/2022    MCV 91.8 05/10/2022     05/10/2022         Physical Examination:  /100   Pulse 85   Temp 97.3 °F (36.3 °C) (Tympanic)   Resp 18   SpO2 97%   General Appearance:   Well developed, well nourished, well groomed, alert, and cooperative.  HEENT: Normocephalic.  Normal fundoscopic exam including normal retina, discs are flat with sharp margins, normal vasculature.  Neck and Spine: Normal range of motion.  Normal alignment. No mass or tenderness. No bruits.  Cardiac: Regular rate and rhythm. No murmurs.  Peripheral Vasculature: Radial and pedal pulses are equal and symmetric. No signs of distal embolization.  Extremities:    No edema or deformities. Normal joint ROM. SIMIN hoses and SCD's in place  Skin:    No rashes or birth marks.    Neurological examination:  Higher Integrative  Function: Oriented to time, place and person. Normal registration, recall, attention span and concentration. Normal language including comprehension, spontaneous speech, repetition, reading, writing, naming and vocabulary. No neglect with normal visual-spatial function and construction. Normal fund of knowledge and higher integrative function.  CN II: Pupils are equal, round, and reactive to light. Normal visual acuity and visual fields.    CN III IV VI: Extraocular movements are full without nystagmus.   CN V: Normal facial sensation and strength of muscles of mastication.  CN VII: Facial movements are symmetric. No weakness.  CN VIII:   Auditory acuity is normal.  CN IX & X:   Symmetric palatal movement.  CN XI: Sternocleidomastoid and trapezius are normal.  No weakness.  CN  XII:   The tongue is midline.  No atrophy or fasciculations.  Motor: Normal muscle strength, bulk and tone in upper and lower extremities.  No fasciculations, rigidity, spasticity, or abnormal movements.  Reflexes: 2+ in the upper and lower extremities. Plantar responses are flexor.  Sensation: Normal to light touch, pinprick, vibration, temperature, and proprioception in arms and legs. Normal graphesthesia and no extinction on DSS.  Station and Gait: Normal gait and station.  Romberg is negative.  Coordination: Finger to nose test shows no dysmetria.    Heel to shin normal.    NIHSS:    Baseline  0-->Alert: keenly responsive  0-->Answers both questions correctly  0-->Performs both tasks correctly  0=normal  0=No visual loss  0=Normal symmetric movement  0-->No drift: limb holds 90 (or 45) degrees for full 10 secs  0-->No drift: limb holds 90 (or 45) degrees for full 10 secs  0-->No drift: limb holds 90 (or 45) degrees for full 10 secs  0-->No drift: limb holds 90 (or 45) degrees for full 10 secs  0=Absent  0=Normal; no sensory loss  0=No aphasia, normal  0=Normal  0=No abnormality    Total score: 0    Diagnoses / Discussion:  51 y.o. who presents with Sx of amaurosis fugax bilaterally.  Poorly controlled hypertension.    Plan:  Aspirin 325 mg  Hydrate  Neuro checks  Check lipid panel, Hgb A1C  Lipitor 80 mg  Non-pharmacological DVT prophylaxis  TTE with bubble study for PFO detection.  CT/CTA of the head and neck with contrast is currently pending.    MRI of the brain with stroke protocol pending.  EKG Tele  PT/OT/ST  Stroke Education  Blood pressure control to keep it between 120 and 140 systolic and diastolic between 70 and 80  Goal LDL <70-recommend high dose statins-   Serum glucose < 140     Discussed signs and symptoms of stroke and when to call 911. Instructed to follow a low fat diet including the Mediterranean diet. Instructed to take all medications daily as prescribed. Encouraged 30 minutes of exercise  3-4 times a week as tolerated. Stay well hydrated. Discussed potential side effects of new medications and signs and symptoms to report. If patient is currently using tobacco products, smoking cessation was encouraged. Reviewed stroke risk factors and stroke prevention plan. Patient  verbalizes understanding and agrees with plan.     I have discussed the above with the patient and emergency room team and he can be brought in to the observation unit.  From tomorrow onwards the inpatient stroke neurology team will follow up the patient..  Time spent with patient: 60min    Coding    Dictated using Dragon dictation.

## 2022-05-11 NOTE — H&P
[facility]   HISTORY AND PHYSICAL    Patient Name: Pravin Penny  : 1970  MRN: [id number]  Primary Care Physician:  Provider, No Known  Date of admission: 5/10/2022    Subjective   Subjective     Chief Complaint:   Chief Complaint   Patient presents with   • Eye Problem         HPI:    Pravin Penny is a 51 y.o. male Indonesian Peruvian-speaking male with history of hypertension who presented to the emergency department today with visual disturbance for 1 day.  Patient states he noticed black spots in his vision last night.  He states black spots do move when he moves his eyes.  He denies vision loss or blurry vision.  Patient also complains of bilateral temporal headache.  Patient denies facial droop, slurred speech, gait disturbance, unilateral weakness, chest pain, shortness of breath, palpitations, nausea, vomiting, diarrhea, fever, chills.    Patient arrived to ED hypertensive.  He is on enalapril and HCTZ, he receives these from pharmacy in [place].  ED evaluation reviewed, patient has CTA head and neck both of which were negative acute.  Laboratory evaluation largely within normal limits.  Patient was seen by neurology in the ER.    Review of Systems   All systems were reviewed and negative except for: What is discussed in the HPI    Personal History     Past Medical History:   Diagnosis Date   • Hypertension        History reviewed. No pertinent surgical history.    Family History: family history is not on file. Otherwise pertinent FHx was reviewed and not pertinent to current issue.    Social History:  reports that he has never smoked. He has never used smokeless tobacco. He reports that he does not drink alcohol and does not use drugs.    Home Medications:  enalapril and hydroCHLOROthiazide    Allergies:  No Known Allergies    Objective   Objective     Vitals:   Temp:  [97.3 °F (36.3 °C)-98.6 °F (37 °C)] 98.6 °F (37 °C)  Heart Rate:  [73-85] 84  Resp:  [16-18] 16  BP:  (154-182)/() 155/98  Physical Exam     GENERAL: 51 y.o. YO male a/o x 4, NAD  SKIN: Warm pink and dry   HEENT:  PERRL, EOM intact, conjunctiva normal, sclera clear, vision grossly intact  NECK: supple, no JVD  LUNGS: Clear to auscultation bilaterally without wheezes, rales or rhonchi.  No accessory muscle use and no nasal flaring.  CARDIAC:  Regular rate and rhythm, S1-S2.  No murmurs, rubs or gallops.  No peripheral edema.  Equal pulses bilaterally.  ABDOMEN: Soft, nontender, nondistended.  No guarding or rebound tenderness.  Normal bowel sounds.  MUSCULOSKELETAL: Moves all extremities well.  No deformity.  NEURO: Cranial nerves II through XII grossly intact.  No gross focal deficits.  Alert.  Normal speech and motor.  PSYCH: Normal mood and affect      Result Review    Result Review:  I have personally reviewed the results from the time of this admission to 5/11/2022 01:38 EDT and agree with these findings:  [x]  Laboratory  []  Microbiology  [x]  Radiology  []  EKG/Telemetry   []  Cardiology/Vascular   []  Pathology  []  Old records  []  Other:  Most notable findings include:     CT Angiogram Neck    Result Date: 5/10/2022  Normal CT angiography imaging of the head and neck with and without contrast.  This report was finalized on 5/10/2022 10:54 PM by Dr. Haile Baer M.D.      XR Chest 1 View    Result Date: 5/10/2022  Negative. There is no enlargement of the cardiac silhouette.  This report was finalized on 5/10/2022 8:02 PM by Dr. Adonay Chicas M.D.      CT Angiogram Head    Result Date: 5/10/2022  Normal CT angiography imaging of the head and neck with and without contrast.  This report was finalized on 5/10/2022 10:54 PM by Dr. Haile Baer M.D.        Assessment & Plan   Assessment / Plan     Brief Patient Summary:  Pravin Penny is a 51 y.o. male who is being admitted to observation unit for further evaluation of vision changes.    Active Hospital Problems:  Active Hospital Problems     Diagnosis    • TIA (transient ischemic attack)      Plan:     1.  Visual disturbance  -TIA work-up  -CTA head neck reviewed, negative acute  -MRI brain, echo pending  -Consult neurology  -Aspirin/statin  -Lipids/hemoglobin A1c  -Monitor    2.  Hypertension  -Patient has been hypertensive during ED stay  -Resume home medications  -Continue to monitor      DVT prophylaxis:  Mechanical DVT prophylaxis orders are present.    CODE STATUS:    Level Of Support Discussed With: Patient  Code Status (Patient has no pulse and is not breathing): CPR (Attempt to Resuscitate)  Medical Interventions (Patient has pulse or is breathing): Full Support    Admission Status:  I believe this patient meets observation status.    I wore an N95 mask, face shield, and gloves during this patient encounter. Patient also wearing a surgical mask throughout encounter. Hand hygeine performed before and after seeing the patient.    Electronically signed by CLAUDIA Perez, 05/10/22, 10:09 PM EDT.

## 2022-05-11 NOTE — PROGRESS NOTES
"DOS: 2022  NAME: Pravin Penny   : 1970  PCP: Provider, No Known  Chief Complaint   Patient presents with   • Eye Problem     Stroke    Subjective: Video  173003 used.  The patient reports no significant change in his vision today.  He denies headache or eye pain.  He reports that there is a cloud over his vision in his left eye.  He denies that his right eye was ever affected.  He denies speech difficulty or unilateral weakness or numbness. Pt seen in follow up today, however the problem is new to the examiner.        Objective:  Vital signs: /93 (BP Location: Left arm, Patient Position: Lying)   Pulse 88   Temp 98.4 °F (36.9 °C) (Oral)   Resp 16   Ht 172.7 cm (68\")   Wt 86.2 kg (190 lb)   SpO2 97%   BMI 28.89 kg/m²       General appearance: Well developed, well nourished, well groomed, alert and cooperative.   HEENT: Normocephalic.   Neck: Supple.   Cardiac: Regular rate and rhythm. No murmurs.   Peripheral Vasculature: Radial pulses are equal and symmetric.  Chest Exam: Clear to auscultation bilaterally, no wheezes, no rhonchi.  Extremities: Normal, no edema.   Skin: No rashes or birthmarks.     Higher integrative function: Oriented to time, place, person, intact recent and remote memory, attention span, concentration and language. Spontaneous speech, fund of vocabulary are normal.   CN II: Visual fields grossly intact in each eye, tested individually.  CN III IV VI: Extraocular movements are full without nystagmus. Pupils are equal, round, and reactive to light. No relative afferent pupillary defect. No internuclear ophthalmoplegia.   CN V: Normal facial sensation.  CN VII: Facial movements are symmetric, no weakness.   CN VIII: Auditory acuity is normal.   CN IX & X: Symmetric palatal movement.   CN XI: Sternocleidomastoid and trapezius are normal. No weakness.   CN XII: The tongue is midline. No atrophy or fasciculations.   Motor: Normal muscle strength, bulk, and " tone in upper and lower extremities. No fasciculations, rigidity, spasticity or abnormal movements.   Sensation: Normal light touch.  Station and gait: Deferred.  Muscle stretch reflexes:  Plantar reflexes are flexor bilaterally.   Coordination: Finger to nose test showed no dysmetria. Rapid alternating movements were normal. Heel to shin normal.     Scheduled Meds:atorvastatin, 80 mg, Oral, Nightly  enalapril, 20 mg, Oral, Daily  fluorescein, 1 strip, Both Eyes, Once  hydroCHLOROthiazide, 25 mg, Oral, Daily  proparacaine, 1 drop, Left Eye, Once  sodium chloride, 10 mL, Intravenous, Q12H      Continuous Infusions:   PRN Meds:.•  acetaminophen  •  nitroglycerin  •  ondansetron **OR** ondansetron  •  sodium chloride    Laboratory results:  Lab Results   Component Value Date    GLUCOSE 95 05/11/2022    CALCIUM 9.5 05/11/2022     05/11/2022    K 3.4 (L) 05/11/2022    CO2 27.2 05/11/2022     05/11/2022    BUN 16 05/11/2022    CREATININE 0.88 05/11/2022    BCR 18.2 05/11/2022    ANIONGAP 11.8 05/11/2022     Lab Results   Component Value Date    WBC 8.93 05/11/2022    HGB 15.5 05/11/2022    HCT 46.2 05/11/2022    MCV 92.4 05/11/2022     05/11/2022     Lab Results   Component Value Date    CHOL 211 (H) 05/10/2022     Lab Results   Component Value Date    HDL 36 (L) 05/10/2022     Lab Results   Component Value Date     (H) 05/10/2022     Lab Results   Component Value Date    TRIG 175 (H) 05/10/2022         Lab 05/10/22  1945   HEMOGLOBIN A1C 5.60      Review and interpretation of imaging:  CT Angiogram Neck    Result Date: 5/10/2022  CT ANGIOGRAM HEAD-, CT ANGIOGRAM NECK-  CLINICAL HISTORY: Vision abnormalities.  TECHNIQUE: Transverse 3 mm thick images were obtained from the base of the skull to the vertex without IV contrast. Subsequently, spiral CT images were obtained through the neck and head during rapid IV injection of contrast and were reconstructed in 1 mm thick axial slices. Multiple  coronal and sagittal and 3-D reconstructions were produced.  Radiation dose reduction techniques were utilized, including automated exposure control and exposure modulation based on body size.  COMPARISON: None  FINDINGS: The precontrast images demonstrate no abnormalities. There is no evidence of recent or old infarct or hemorrhage. There are no masses. In particular, no mass is evident within or adjacent to the sella turcica. The orbits appear grossly normal.  There is normal branching of the great vessels from the aortic arch that all appear widely patent without NASCET significant stenosis. No stenoses are present within either common or external or internal carotid artery within the neck. The vertebral arteries are codominant and are both widely patent throughout their course in the neck and head. Both supply the basilar artery. There are no intracranial stenoses or occlusions. No aneurysms are evident.  Postcontrast images of the brain parenchyma demonstrate no enhancing lesions. The paranasal sinuses are well aerated.      Normal CT angiography imaging of the head and neck with and without contrast.  This report was finalized on 5/10/2022 10:54 PM by Dr. Haile aBer M.D.      MRI Brain Without Contrast    Result Date: 5/11/2022  MRI EXAMINATION OF THE BRAIN WITHOUT CONTRAST  HISTORY: TIA.  COMPARISON: CT angiogram 05/10/2022.  TECHNIQUE: A MRI examination of the brain was performed before and after the intravenous administration of contrast utilizing sagittal T1, axial diffusion, T1, T2, T2 FLAIR and gradient echo T2 imaging.  FINDINGS: There is no evidence of restricted diffusion to suggest acute infarction. The brain ventricles are symmetrical. There is expected flow-void in the basilar artery and in the distal aspect of the internal carotid arteries bilaterally on the axial T2 sequence. A small volume of fluid is present within the mastoid air cells on the right. There is no evidence of mass or mass  effect on this noncontrasted MRI examination of brain.      No evidence of acute infarction.  This report was finalized on 5/11/2022 12:54 PM by Dr. Jan Olmstead M.D.      XR Chest 1 View    Result Date: 5/10/2022  PORTABLE CHEST X-RAY  HISTORY: Evaluate cardiomegaly emergently.  Portable chest x-ray is provided. Correlation: None.  FINDINGS: The cardiomediastinal silhouette is normal. The lungs are clear. The costophrenic sulci are dry and the bones appear normal. There is no pneumothorax.      Negative. There is no enlargement of the cardiac silhouette.  This report was finalized on 5/10/2022 8:02 PM by Dr. Adonay Chicas M.D.      CT Angiogram Head    Result Date: 5/10/2022  CT ANGIOGRAM HEAD-, CT ANGIOGRAM NECK-  CLINICAL HISTORY: Vision abnormalities.  TECHNIQUE: Transverse 3 mm thick images were obtained from the base of the skull to the vertex without IV contrast. Subsequently, spiral CT images were obtained through the neck and head during rapid IV injection of contrast and were reconstructed in 1 mm thick axial slices. Multiple coronal and sagittal and 3-D reconstructions were produced.  Radiation dose reduction techniques were utilized, including automated exposure control and exposure modulation based on body size.  COMPARISON: None  FINDINGS: The precontrast images demonstrate no abnormalities. There is no evidence of recent or old infarct or hemorrhage. There are no masses. In particular, no mass is evident within or adjacent to the sella turcica. The orbits appear grossly normal.  There is normal branching of the great vessels from the aortic arch that all appear widely patent without NASCET significant stenosis. No stenoses are present within either common or external or internal carotid artery within the neck. The vertebral arteries are codominant and are both widely patent throughout their course in the neck and head. Both supply the basilar artery. There are no intracranial stenoses or  occlusions. No aneurysms are evident.  Postcontrast images of the brain parenchyma demonstrate no enhancing lesions. The paranasal sinuses are well aerated.      Normal CT angiography imaging of the head and neck with and without contrast.  This report was finalized on 5/10/2022 10:54 PM by Dr. Haile Baer M.D.        Impression:  51-year-old Andrew male with HTN who presented 5/10 with complaints of black spots in his vision that started the night before.  He initially had an associated bitemporal headache which has resolved.  Vision change has persisted and today he reports it is only affecting his left eye, describes it as a cloud being in his vision.  BP was 182/117 on admission.  Did not have any associated speech difficulty or focal numbness/weakness.    Work-up:  CTA head/neck: Normal, no significant stenosis, occlusions, or aneurysms.  MRI brain without contrast: No acute findings  Labs: Triglycerides 175, , HDL 36, total cholesterol 211, CRP 0.52, hemoglobin A1c 5.60%, sed rate 5    Diagnosis:   Visual disturbance affecting his left eye  HTN  HLD    Plan:  MRI brain was negative for acute findings to explain his vision problems and CTA was negative  Follow-up 2D echo, if negative okay for discharge from neurology stand up with close follow-up with ophthalmology since they are unable to see him here in the hospital.  Recommend starting statin for his hyperlipidemia.  D/W Dr Mcdonald today and Grace TAYLOR.

## 2022-05-11 NOTE — PLAN OF CARE
Goal Outcome Evaluation:  Plan of Care Reviewed With: patient           Outcome Evaluation: VSS, MRI and echo completed, optho consult placed, outpatient f/u recommended.  Awaiting ECHO results at this time

## 2022-05-12 NOTE — OUTREACH NOTE
Prep Survey    Flowsheet Row Responses   [facility] facility patient discharged from? [facility]   Is LACE score < 7 ? Yes   Emergency Room discharge w/ pulse ox? No   Eligibility Readm Mgmt   Discharge diagnosis  Visual disturbance   Does the patient have one of the following disease processes/diagnoses(primary or secondary)? Other   Does the patient have Home health ordered? No   Is there a DME ordered? No   Prep survey completed? Yes          NATHANIEL STANTON - Registered Nurse